# Patient Record
Sex: FEMALE | Race: WHITE | NOT HISPANIC OR LATINO | Employment: OTHER | URBAN - METROPOLITAN AREA
[De-identification: names, ages, dates, MRNs, and addresses within clinical notes are randomized per-mention and may not be internally consistent; named-entity substitution may affect disease eponyms.]

---

## 2017-01-03 RX ORDER — CLONAZEPAM 1 MG/1
1 TABLET ORAL DAILY
Qty: 30 TABLET | Refills: 2 | Status: CANCELLED | OUTPATIENT
Start: 2017-01-03

## 2017-01-06 ENCOUNTER — PATIENT MESSAGE (OUTPATIENT)
Dept: FAMILY MEDICINE | Facility: CLINIC | Age: 82
End: 2017-01-06

## 2017-01-10 ENCOUNTER — PATIENT MESSAGE (OUTPATIENT)
Dept: FAMILY MEDICINE | Facility: CLINIC | Age: 82
End: 2017-01-10

## 2017-01-10 DIAGNOSIS — N39.0 RECURRENT UTI: Primary | ICD-10-CM

## 2017-01-12 ENCOUNTER — PATIENT MESSAGE (OUTPATIENT)
Dept: UROGYNECOLOGY | Facility: CLINIC | Age: 82
End: 2017-01-12

## 2017-01-12 NOTE — TELEPHONE ENCOUNTER
Discussed no urine results from 12/29/2016. Daughter states she is bringing in a specimen tomorrow. Anahy Duckworth, TEGAN-BC

## 2017-01-12 NOTE — TELEPHONE ENCOUNTER
Spoke with the pt, daughter over the phone. Advised that there are no recent results in the pt's chart. New orders have been entered. Urine collection kit has been placed at the  for . Daughter has been advised.

## 2017-01-13 ENCOUNTER — LAB VISIT (OUTPATIENT)
Dept: LAB | Facility: HOSPITAL | Age: 82
End: 2017-01-13
Attending: FAMILY MEDICINE
Payer: MEDICARE

## 2017-01-13 DIAGNOSIS — N39.0 RECURRENT UTI: ICD-10-CM

## 2017-01-13 LAB
BACTERIA #/AREA URNS AUTO: ABNORMAL /HPF
BILIRUB UR QL STRIP: NEGATIVE
CAOX CRY UR QL COMP ASSIST: ABNORMAL
CLARITY UR REFRACT.AUTO: ABNORMAL
COLOR UR AUTO: YELLOW
GLUCOSE UR QL STRIP: NEGATIVE
HGB UR QL STRIP: NEGATIVE
HYALINE CASTS UR QL AUTO: 4 /LPF
KETONES UR QL STRIP: NEGATIVE
LEUKOCYTE ESTERASE UR QL STRIP: ABNORMAL
MICROSCOPIC COMMENT: ABNORMAL
NITRITE UR QL STRIP: NEGATIVE
PH UR STRIP: 6 [PH] (ref 5–8)
PROT UR QL STRIP: ABNORMAL
RBC #/AREA URNS AUTO: 3 /HPF (ref 0–4)
SP GR UR STRIP: 1.02 (ref 1–1.03)
SQUAMOUS #/AREA URNS AUTO: 3 /HPF
URN SPEC COLLECT METH UR: ABNORMAL
UROBILINOGEN UR STRIP-ACNC: NEGATIVE EU/DL
WBC #/AREA URNS AUTO: >100 /HPF (ref 0–5)
WBC CLUMPS UR QL AUTO: ABNORMAL

## 2017-01-13 PROCEDURE — 81001 URINALYSIS AUTO W/SCOPE: CPT

## 2017-01-13 PROCEDURE — 87086 URINE CULTURE/COLONY COUNT: CPT

## 2017-01-14 PROBLEM — I70.0 AORTIC ATHEROSCLEROSIS: Status: ACTIVE | Noted: 2017-01-14

## 2017-01-15 LAB — BACTERIA UR CULT: NORMAL

## 2017-01-17 ENCOUNTER — OFFICE VISIT (OUTPATIENT)
Dept: UROLOGY | Facility: CLINIC | Age: 82
End: 2017-01-17
Payer: MEDICARE

## 2017-01-17 VITALS
BODY MASS INDEX: 29.13 KG/M2 | DIASTOLIC BLOOD PRESSURE: 58 MMHG | HEART RATE: 62 BPM | HEIGHT: 62 IN | WEIGHT: 158.31 LBS | SYSTOLIC BLOOD PRESSURE: 131 MMHG

## 2017-01-17 DIAGNOSIS — N39.0 RECURRENT UTI: Primary | ICD-10-CM

## 2017-01-17 PROCEDURE — 87086 URINE CULTURE/COLONY COUNT: CPT

## 2017-01-17 PROCEDURE — 99999 PR PBB SHADOW E&M-EST. PATIENT-LVL III: CPT | Mod: PBBFAC,,, | Performed by: UROLOGY

## 2017-01-17 PROCEDURE — 99204 OFFICE O/P NEW MOD 45 MIN: CPT | Mod: S$PBB,,, | Performed by: UROLOGY

## 2017-01-17 PROCEDURE — 99213 OFFICE O/P EST LOW 20 MIN: CPT | Mod: PBBFAC | Performed by: UROLOGY

## 2017-01-17 NOTE — PROGRESS NOTES
Subjective:       Patient ID: Reagan Henry is a 89 y.o. female.    Chief Complaint: Advice Only and Urinary Tract Infection (Recurrent)    HPI Comments: Reagan Henry is a 89 y.o. Female here for recurrent UTI. Different organisms.   She also has constipation. She is using fiber gummies.   She is on activia yogurt.     Nocturia x 2-3 times.  Sometimes more 6-8 times. Has swelling in legs at the end of the day. She elevates legs before bed.   She does drink water overnight.   She drinks little water during the day.   Has BM at night sometimes.   No urinary leakage.   No urinary frequency.     She has a cystocele and wears a pessary.   Takes replens.   No history of breast or uterine cancer.    vaginal deliveries.             Urinary Tract Infection    Associated symptoms include frequency. Pertinent negatives include no chills, hematuria, urgency or rash.       Past Surgical History   Procedure Laterality Date    Appendectomy      Cholecystectomy      Eye surgery       cataract       Past Medical History   Diagnosis Date    *Atrial fibrillation      during a pulmonary illness    Arthritis     Broken wrist     CAD (coronary artery disease)     HLD (hyperlipidemia)     Hypertension     Hyponatremia     Incontinence of urine     Osteoporosis, senile        Social History     Social History    Marital status:      Spouse name: N/A    Number of children: N/A    Years of education: N/A     Occupational History    Not on file.     Social History Main Topics    Smoking status: Never Smoker    Smokeless tobacco: Never Used    Alcohol use 0.6 oz/week     1 Glasses of wine per week    Drug use: No    Sexual activity: Not Currently     Other Topics Concern    Not on file     Social History Narrative       Family History   Problem Relation Age of Onset    Cancer Mother      liver    Cancer Father      throat cancer    Heart disease Father     Cancer Sister      lung    Heart disease  Brother      premature    Stroke Brother     Breast cancer Neg Hx     Ovarian cancer Neg Hx     Cervical cancer Neg Hx     Endometrial cancer Neg Hx     Vaginal cancer Neg Hx        Current Outpatient Prescriptions   Medication Sig Dispense Refill    amlodipine (NORVASC) 5 MG tablet TAKE 1 TABLET BY MOUTH ONCE A DAY 30 tablet 5    aspirin (ECOTRIN) 81 MG EC tablet Take 81 mg by mouth as needed.       clonazePAM (KLONOPIN) 1 MG tablet TAKE 1 TABLET BY MOUTH ONCE DAILY. 30 tablet 2    ibuprofen (ADVIL,MOTRIN) 200 MG tablet Take 200 mg by mouth every 6 (six) hours as needed.        lisinopril (PRINIVIL,ZESTRIL) 20 MG tablet Take 1 tablet (20 mg total) by mouth once daily. 30 tablet 5    metoprolol tartrate (LOPRESSOR) 25 MG tablet TAKE 1 TABLET (25 MG TOTAL) BY MOUTH 2 (TWO) TIMES DAILY. 60 tablet 5    omeprazole (PRILOSEC) 20 MG capsule Take 20 mg by mouth once daily.        POLYCARBOPHIL (REPLENS VAGL) Place vaginally. Three times weekly      simvastatin (ZOCOR) 20 MG tablet TAKE 1 TABLET (20 MG TOTAL) BY MOUTH EVERY EVENING. 30 tablet 5    furosemide (LASIX) 20 MG tablet Take 2 tablets (40 mg total) by mouth as needed (for shortness of breath). 60 tablet 2    nitroGLYCERIN (NITROSTAT) 0.4 MG SL tablet Place 1 tablet (0.4 mg total) under the tongue every 5 (five) minutes as needed. 100 tablet 3     No current facility-administered medications for this visit.        Review of patient's allergies indicates:   Allergen Reactions    Codeine Nausea Only    Demerol (pf) [meperidine (pf)]     Phenytoin sodium extended Nausea Only       Review of Systems   Constitutional: Negative for chills, fever and unexpected weight change.   HENT: Negative for nosebleeds.    Eyes: Negative for visual disturbance.   Respiratory: Positive for shortness of breath. Negative for chest tightness.    Cardiovascular: Negative for chest pain.   Gastrointestinal: Negative for diarrhea.   Genitourinary: Positive for frequency  and nocturia. Negative for dysuria, hematuria and urgency.   Musculoskeletal: Negative for myalgias.   Skin: Negative for rash.   Neurological: Negative for seizures.   Hematological: Does not bruise/bleed easily.   Psychiatric/Behavioral: Negative for behavioral problems.       Objective:      Physical Exam   Vitals reviewed.  Constitutional: She is oriented to person, place, and time. She appears well-developed and well-nourished.   HENT:   Head: Normocephalic and atraumatic.   Eyes: No scleral icterus.   Cardiovascular: Normal rate and regular rhythm.    Pulmonary/Chest: Effort normal. No respiratory distress.   Abdominal: She exhibits no mass.   Genitourinary: There is no rash on the right labia. There is no rash on the left labia.   Genitourinary Comments: The external genitalia were normal. No rash. Atrophic vaginitis was present. The urethral showed no evidence of a urethral diverticulum.  And in and out cath was performed under sterile conditions immediately after voiding.  The PVR was 100 ml.    No prolapse.      Musculoskeletal: She exhibits no tenderness.   Lymphadenopathy:     She has no cervical adenopathy.   Neurological: She is alert and oriented to person, place, and time.   Skin: Skin is warm and dry. No rash noted.     Psychiatric: She has a normal mood and affect.       Assessment:       1. Recurrent UTI        Plan:     Discussed UTI precaustion. Avoid constipation. Try miralax. Push fluids.   Discussed estrogen (patient has vaginal bleeding in past with hormone cream.   Will treat as infections come along.   Urine culture for future UTI.    Urine culture today.

## 2017-01-18 ENCOUNTER — PATIENT MESSAGE (OUTPATIENT)
Dept: UROLOGY | Facility: CLINIC | Age: 82
End: 2017-01-18

## 2017-01-18 LAB — BACTERIA UR CULT: NO GROWTH

## 2017-02-11 DIAGNOSIS — I10 ESSENTIAL HYPERTENSION: ICD-10-CM

## 2017-02-11 RX ORDER — METOPROLOL TARTRATE 25 MG/1
TABLET, FILM COATED ORAL
Qty: 60 TABLET | Refills: 4 | Status: SHIPPED | OUTPATIENT
Start: 2017-02-11 | End: 2017-09-15 | Stop reason: SDUPTHER

## 2017-02-13 DIAGNOSIS — I10 ESSENTIAL HYPERTENSION: ICD-10-CM

## 2017-02-13 RX ORDER — METOPROLOL TARTRATE 25 MG/1
25 TABLET, FILM COATED ORAL 2 TIMES DAILY
Qty: 60 TABLET | Refills: 5 | Status: SHIPPED | OUTPATIENT
Start: 2017-02-13 | End: 2017-05-29

## 2017-02-13 NOTE — TELEPHONE ENCOUNTER
LOV 06/28/16  [facility] message has been sent to the patient, to inform them that an appointment is due.

## 2017-02-16 RX ORDER — LISINOPRIL 20 MG/1
20 TABLET ORAL DAILY
Qty: 90 TABLET | Refills: 1 | Status: SHIPPED | OUTPATIENT
Start: 2017-02-16 | End: 2017-09-16 | Stop reason: SDUPTHER

## 2017-02-16 RX ORDER — SIMVASTATIN 20 MG/1
20 TABLET, FILM COATED ORAL NIGHTLY
Qty: 90 TABLET | Refills: 1 | Status: SHIPPED | OUTPATIENT
Start: 2017-02-16 | End: 2017-09-16 | Stop reason: SDUPTHER

## 2017-03-01 ENCOUNTER — PATIENT MESSAGE (OUTPATIENT)
Dept: UROLOGY | Facility: CLINIC | Age: 82
End: 2017-03-01

## 2017-03-02 ENCOUNTER — LAB VISIT (OUTPATIENT)
Dept: LAB | Facility: HOSPITAL | Age: 82
End: 2017-03-02
Attending: UROLOGY
Payer: MEDICARE

## 2017-03-02 DIAGNOSIS — N39.0 RECURRENT UTI: ICD-10-CM

## 2017-03-02 PROCEDURE — 87086 URINE CULTURE/COLONY COUNT: CPT

## 2017-03-05 LAB
BACTERIA UR CULT: NORMAL
BACTERIA UR CULT: NORMAL

## 2017-03-06 ENCOUNTER — PATIENT MESSAGE (OUTPATIENT)
Dept: UROLOGY | Facility: CLINIC | Age: 82
End: 2017-03-06

## 2017-03-09 ENCOUNTER — PATIENT MESSAGE (OUTPATIENT)
Dept: UROLOGY | Facility: CLINIC | Age: 82
End: 2017-03-09

## 2017-03-26 RX ORDER — CLONAZEPAM 1 MG/1
TABLET ORAL
Qty: 30 TABLET | Refills: 2 | Status: SHIPPED | OUTPATIENT
Start: 2017-03-26 | End: 2017-06-29 | Stop reason: SDUPTHER

## 2017-04-06 ENCOUNTER — PATIENT MESSAGE (OUTPATIENT)
Dept: FAMILY MEDICINE | Facility: CLINIC | Age: 82
End: 2017-04-06

## 2017-04-06 NOTE — TELEPHONE ENCOUNTER
Spoke with the patient's daughter (Roxi) & the patient, they advised that the patient the gums are inflamed & the patient thinks she has a cracked tooth on the left lower side. These symptoms have been going on for a week, and the patient has been chewing food on the right side of her mouth. Patient denies any cardiac symptoms. Roxi advised that the patient can't be taken to the doctor due to loose stool, and she does not feel that the ED is needed. Roxi has been alternating ibuprofen & tylenol with out much pain relief for the patient. She stated that the patient just needs antibiotic. Offered appointments for the patient, but the daughter did not want to schedule. Advised that if the pain was severe she should take the patient to the ED. Roxi verbalized understanding, and stated that she would call the dentist on Monday.

## 2017-05-27 ENCOUNTER — PATIENT MESSAGE (OUTPATIENT)
Dept: FAMILY MEDICINE | Facility: CLINIC | Age: 82
End: 2017-05-27

## 2017-05-27 DIAGNOSIS — I10 ESSENTIAL HYPERTENSION: ICD-10-CM

## 2017-05-27 RX ORDER — AMLODIPINE BESYLATE 5 MG/1
TABLET ORAL
Qty: 30 TABLET | Refills: 2 | Status: SHIPPED | OUTPATIENT
Start: 2017-05-27 | End: 2017-08-26 | Stop reason: SDUPTHER

## 2017-05-27 NOTE — LETTER
June 20, 2017    Reagan Henry  [address] [place] [address]             [facility]  [address], [address]  [facility] [address]  Phone: [phone]  Fax: [phone] Dear MsCharly Henry:    Good  morning.  Per Dr. Horn, you are overdue for your annual exam with fasting labs prior.  Please call the clinic [phone] to schedule these two appointments.  You can either schedule with her or the nurse practitioner.  Thank you.      If you have any questions or concerns, please don't hesitate to call.    Sincerely,        Alis Deleon RN

## 2017-05-29 ENCOUNTER — TELEPHONE (OUTPATIENT)
Dept: FAMILY MEDICINE | Facility: CLINIC | Age: 82
End: 2017-05-29

## 2017-05-29 ENCOUNTER — OFFICE VISIT (OUTPATIENT)
Dept: FAMILY MEDICINE | Facility: CLINIC | Age: 82
End: 2017-05-29
Payer: MEDICARE

## 2017-05-29 VITALS — HEART RATE: 60 BPM | TEMPERATURE: 98 F | SYSTOLIC BLOOD PRESSURE: 118 MMHG | DIASTOLIC BLOOD PRESSURE: 52 MMHG

## 2017-05-29 DIAGNOSIS — I35.0 NONRHEUMATIC AORTIC VALVE STENOSIS: Chronic | ICD-10-CM

## 2017-05-29 DIAGNOSIS — R41.3 MEMORY LOSS: Primary | ICD-10-CM

## 2017-05-29 DIAGNOSIS — K04.7 DENTAL INFECTION: Primary | ICD-10-CM

## 2017-05-29 PROCEDURE — 99999 PR PBB SHADOW E&M-EST. PATIENT-LVL III: CPT | Mod: PBBFAC,,, | Performed by: FAMILY MEDICINE

## 2017-05-29 PROCEDURE — 99213 OFFICE O/P EST LOW 20 MIN: CPT | Mod: PBBFAC,PO | Performed by: FAMILY MEDICINE

## 2017-05-29 PROCEDURE — 99213 OFFICE O/P EST LOW 20 MIN: CPT | Mod: S$PBB,,, | Performed by: FAMILY MEDICINE

## 2017-05-29 RX ORDER — PENICILLIN V POTASSIUM 250 MG/1
250 TABLET, FILM COATED ORAL 4 TIMES DAILY
Qty: 40 TABLET | Refills: 0 | Status: SHIPPED | OUTPATIENT
Start: 2017-05-29 | End: 2017-09-20

## 2017-05-29 RX ORDER — IBUPROFEN 400 MG/1
400 TABLET ORAL EVERY 6 HOURS PRN
Qty: 40 TABLET | Refills: 0 | Status: SHIPPED | OUTPATIENT
Start: 2017-05-29 | End: 2017-09-20

## 2017-05-29 NOTE — TELEPHONE ENCOUNTER
Per provider's instructions, patient's daughter notified that visit may not be covered by insurance if she has a true dental problem. Daughter verbalizes understanding. Voices that she had a dental issue before, was seen at urgent care and visit was covered. Voices that she would like to keep appointment regardless of coverage.

## 2017-05-29 NOTE — TELEPHONE ENCOUNTER
----- Message from Anusha Hercules sent at 5/29/2017 12:30 PM CDT -----  Contact: Roxi daughter- [phone]  Daughter needs information about pre hospice program and would like to be called back with information.

## 2017-06-02 NOTE — TELEPHONE ENCOUNTER
I can put in a consult for someone to come out discuss this option with them  and then if they would like to proceed I can put in an order for that.

## 2017-06-29 ENCOUNTER — PATIENT MESSAGE (OUTPATIENT)
Dept: FAMILY MEDICINE | Facility: CLINIC | Age: 82
End: 2017-06-29

## 2017-06-29 RX ORDER — CLONAZEPAM 1 MG/1
TABLET ORAL
Qty: 30 TABLET | Refills: 2 | Status: CANCELLED | OUTPATIENT
Start: 2017-06-29

## 2017-06-29 RX ORDER — CLONAZEPAM 1 MG/1
1 TABLET ORAL DAILY
Qty: 30 TABLET | Refills: 2 | Status: SHIPPED | OUTPATIENT
Start: 2017-06-29 | End: 2017-09-23 | Stop reason: SDUPTHER

## 2017-06-29 NOTE — TELEPHONE ENCOUNTER
She is due for a physical. We can not refill this again after this without an appointment-please let her daughter know.(pt has some mild dementia) thanks

## 2017-07-07 ENCOUNTER — PATIENT MESSAGE (OUTPATIENT)
Dept: FAMILY MEDICINE | Facility: CLINIC | Age: 82
End: 2017-07-07

## 2017-07-07 DIAGNOSIS — I10 ESSENTIAL HYPERTENSION: ICD-10-CM

## 2017-07-07 DIAGNOSIS — E78.5 HYPERLIPIDEMIA, UNSPECIFIED HYPERLIPIDEMIA TYPE: Primary | ICD-10-CM

## 2017-07-28 ENCOUNTER — PATIENT MESSAGE (OUTPATIENT)
Dept: FAMILY MEDICINE | Facility: CLINIC | Age: 82
End: 2017-07-28

## 2017-07-28 ENCOUNTER — PATIENT MESSAGE (OUTPATIENT)
Dept: UROLOGY | Facility: CLINIC | Age: 82
End: 2017-07-28

## 2017-07-28 DIAGNOSIS — N39.0 URINARY TRACT INFECTION WITHOUT HEMATURIA, SITE UNSPECIFIED: Primary | ICD-10-CM

## 2017-07-29 ENCOUNTER — LAB VISIT (OUTPATIENT)
Dept: LAB | Facility: HOSPITAL | Age: 82
End: 2017-07-29
Attending: UROLOGY
Payer: MEDICARE

## 2017-07-29 DIAGNOSIS — N39.0 URINARY TRACT INFECTION WITHOUT HEMATURIA, SITE UNSPECIFIED: ICD-10-CM

## 2017-07-29 PROCEDURE — 87086 URINE CULTURE/COLONY COUNT: CPT

## 2017-07-30 LAB — BACTERIA UR CULT: NORMAL

## 2017-07-31 ENCOUNTER — PATIENT MESSAGE (OUTPATIENT)
Dept: UROLOGY | Facility: CLINIC | Age: 82
End: 2017-07-31

## 2017-08-21 RX ORDER — NITROGLYCERIN 0.4 MG/1
0.4 TABLET SUBLINGUAL EVERY 5 MIN PRN
Qty: 100 TABLET | Refills: 3 | Status: SHIPPED | OUTPATIENT
Start: 2017-08-21

## 2017-08-26 DIAGNOSIS — I10 ESSENTIAL HYPERTENSION: ICD-10-CM

## 2017-08-26 RX ORDER — AMLODIPINE BESYLATE 5 MG/1
TABLET ORAL
Qty: 30 TABLET | Refills: 2 | Status: SHIPPED | OUTPATIENT
Start: 2017-08-26 | End: 2017-11-18 | Stop reason: SDUPTHER

## 2017-09-15 DIAGNOSIS — I10 ESSENTIAL HYPERTENSION: ICD-10-CM

## 2017-09-15 RX ORDER — METOPROLOL TARTRATE 25 MG/1
TABLET, FILM COATED ORAL
Qty: 60 TABLET | Refills: 2 | Status: SHIPPED | OUTPATIENT
Start: 2017-09-15 | End: 2018-02-16 | Stop reason: SDUPTHER

## 2017-09-17 RX ORDER — SIMVASTATIN 20 MG/1
20 TABLET, FILM COATED ORAL NIGHTLY
Qty: 90 TABLET | Refills: 1 | Status: SHIPPED | OUTPATIENT
Start: 2017-09-17

## 2017-09-17 RX ORDER — LISINOPRIL 20 MG/1
20 TABLET ORAL DAILY
Qty: 90 TABLET | Refills: 1 | Status: SHIPPED | OUTPATIENT
Start: 2017-09-17

## 2017-09-20 ENCOUNTER — OFFICE VISIT (OUTPATIENT)
Dept: UROGYNECOLOGY | Facility: CLINIC | Age: 82
End: 2017-09-20
Payer: MEDICARE

## 2017-09-20 VITALS
WEIGHT: 146.81 LBS | HEIGHT: 62 IN | BODY MASS INDEX: 27.02 KG/M2 | SYSTOLIC BLOOD PRESSURE: 130 MMHG | DIASTOLIC BLOOD PRESSURE: 60 MMHG

## 2017-09-20 DIAGNOSIS — K59.00 CONSTIPATION, UNSPECIFIED CONSTIPATION TYPE: ICD-10-CM

## 2017-09-20 DIAGNOSIS — N95.2 VAGINAL ATROPHY: ICD-10-CM

## 2017-09-20 DIAGNOSIS — B37.2 YEAST DERMATITIS: ICD-10-CM

## 2017-09-20 DIAGNOSIS — N39.46 MIXED STRESS AND URGE URINARY INCONTINENCE: Primary | ICD-10-CM

## 2017-09-20 PROCEDURE — 1159F MED LIST DOCD IN RCRD: CPT | Mod: ,,, | Performed by: NURSE PRACTITIONER

## 2017-09-20 PROCEDURE — 99213 OFFICE O/P EST LOW 20 MIN: CPT | Mod: PBBFAC | Performed by: NURSE PRACTITIONER

## 2017-09-20 PROCEDURE — 1126F AMNT PAIN NOTED NONE PRSNT: CPT | Mod: ,,, | Performed by: NURSE PRACTITIONER

## 2017-09-20 PROCEDURE — 99213 OFFICE O/P EST LOW 20 MIN: CPT | Mod: S$PBB,,, | Performed by: NURSE PRACTITIONER

## 2017-09-20 PROCEDURE — 99999 PR PBB SHADOW E&M-EST. PATIENT-LVL III: CPT | Mod: PBBFAC,,, | Performed by: NURSE PRACTITIONER

## 2017-09-20 RX ORDER — NYSTATIN AND TRIAMCINOLONE ACETONIDE 100000; 1 [USP'U]/G; MG/G
OINTMENT TOPICAL 2 TIMES DAILY
Qty: 30 G | Refills: 2 | Status: SHIPPED | OUTPATIENT
Start: 2017-09-20 | End: 2018-01-02 | Stop reason: SDUPTHER

## 2017-09-20 RX ORDER — NYSTATIN 100000 [USP'U]/G
POWDER TOPICAL 2 TIMES DAILY
Qty: 30 G | Refills: 2 | Status: SHIPPED | OUTPATIENT
Start: 2017-09-20 | End: 2018-01-02 | Stop reason: SDUPTHER

## 2017-09-20 NOTE — PROGRESS NOTES
PESSARY CHECK  2017    [facility]  [address] [address]    Reagan KESSLER Carl  [id number]  [id number]      Zafarmatthew KESSLER Carl is a 89 y.o.   here for a pessary check/cleaning.      HPI dated 2016  1)  UI:  (--) TRINA   (+) UUI  (+) liner pads:2/day, usually minimum wetness. Daytime frequency: Q 4 hours.  Nocturia: Yes: 1/night.   (--) dysuria,  (--) hematuria,  (--) frequent UTIs.  (+) complete bladder emptying.   2)  POP:  Absent with pessary in place. Symptoms:(--)   (--) vaginal bleeding (--) vaginal discharge. (--) sexually active.   (+)  Vaginal dryness.  (--) vaginal estrogen use.   3)  BM:  (+) constipation/straining.  (--) chronic diarrhea. (--) hematochezia.  (--) fecal incontinence.  (--) fecal smearing/urgency.  (--) incomplete evacuation.  Uses miralax occasionally.  4)Pessary: No pain, Yes bleeding, No discharge  5)Post menopausal bleeding-- does not want to work up at this time due to chf.      History since last visit.  1)  UI:  (--) TRINA   (+) UUI  (+) liner pads:2/day, usually minimum wetness. Daytime frequency: Q 4 hours.  Nocturia: Yes: 1/night.   (--) dysuria,  (--) hematuria,  (--) frequent UTIs.  (+) complete bladder emptying.     2)  POP:  Absent with pessary in place. Symptoms:(--)   (--) vaginal bleeding (--) vaginal discharge. (--) sexually active.   (+)  Vaginal dryness.  (--) vaginal estrogen use.     3)  BM:  (+) constipation/straining.  (--) chronic diarrhea. (--) hematochezia.  (--) fecal incontinence.  (--) fecal smearing/urgency.  (--) incomplete evacuation.  Uses miralax occasionally.    4)Pessary: No pain, Yes bleeding, No discharge    5)Post menopausal bleeding-- does not want to work up at this time due to chf.    2014  embx-TISSUE INSUFFICIENT FOR DIAGNOSIS RARE INACTIVE GLANDULAR FRAGMENTS    2015  Only transabdominal scans of the pelvis were performed because is there is a pessary present and there was no one available in  the OB/GYN department to remove it. Transabdominally the uterus measures 5.21 0.9 x 2.9 cm with some fluid in the endometrium measuring about 3 to 4 mm in diameter. Ovaries are not seen.    Has not had repeat pelvic u/s    Past Medical History:   Diagnosis Date    *Atrial fibrillation 8/09    during a pulmonary illness    Arthritis     Broken wrist     CAD (coronary artery disease)     HLD (hyperlipidemia)     Hypertension     Hyponatremia     Incontinence of urine     Osteoporosis, senile        Past Surgical History:   Procedure Laterality Date    APPENDECTOMY      CHOLECYSTECTOMY      EYE SURGERY      cataract       Current Outpatient Prescriptions   Medication Sig    amlodipine (NORVASC) 5 MG tablet TAKE 1 TABLET BY MOUTH ONCE A DAY    aspirin (ECOTRIN) 81 MG EC tablet Take 81 mg by mouth as needed.     ibuprofen (ADVIL,MOTRIN) 200 MG tablet Take 200 mg by mouth every 6 (six) hours as needed.      lisinopril (PRINIVIL,ZESTRIL) 20 MG tablet TAKE 1 TABLET (20 MG TOTAL) BY MOUTH ONCE DAILY.    metoprolol tartrate (LOPRESSOR) 25 MG tablet TAKE 1 TABLET (25 MG TOTAL) BY MOUTH 2 (TWO) TIMES DAILY.    NITROSTAT 0.4 mg SL tablet PLACE 1 TABLET (0.4 MG TOTAL) UNDER THE TONGUE EVERY 5 (FIVE) MINUTES AS NEEDED.    omeprazole (PRILOSEC) 20 MG capsule Take 20 mg by mouth once daily.      POLYCARBOPHIL (REPLENS VAGL) Place vaginally. Three times weekly    simvastatin (ZOCOR) 20 MG tablet TAKE 1 TABLET (20 MG TOTAL) BY MOUTH EVERY EVENING.    clonazePAM (KLONOPIN) 1 MG tablet TAKE 1 TABLET (1 MG TOTAL) BY MOUTH ONCE DAILY.    nystatin (NYSTOP) powder Apply topically 2 (two) times daily.    nystatin-triamcinolone (MYCOLOG) ointment Apply topically 2 (two) times daily.     No current facility-administered medications for this visit.          Well Woman:  Pap:2/2013- normal  Mammo:not up to day  Colonoscopy: patient does not want at this time.  Dexa:patient does not want at this time  ROS:  As per HPI.   "    Exam  /60 (BP Location: Right arm, Patient Position: Sitting, BP Method: Large (Manual))   Ht 5' 2" (1.575 m)   Wt 66.6 kg (146 lb 13.2 oz)   BMI 26.85 kg/m²   General: alert and oriented, no acute distress  Abd: soft, non-tender, non-distended    Pelvic  Ext. Genitalia: Red, raised rash noted in bilateral groin area consistent with yeast dermatitis.  Vagina: + atrophy. Normal vaginal mucosa without lesions. No granulation tissue or discharge noted.  No bleeding noted.  Non-tender bladder base without palpable mass. 2  LS GH pessary in place.  Cervix: no lesions-   Uterus:  uterus is  8 week size, shape, consistency and nontender   Urethra: no masses or tenderness  Urethral meatus: no lesions, caruncle or prolapse.    The pessary device was removed with difficulty.   Washed with soap and water.  The pessary device was replaced without issue.     The patient tolerated the procedure well.      POP-Q:  Deferred due to pessary use      1. Mixed stress and urge urinary incontinence     2. Yeast dermatitis  nystatin-triamcinolone (MYCOLOG) ointment    nystatin (NYSTOP) powder   3. Vaginal atrophy     4. Constipation, unspecified constipation type           We reviewed the above issues and discussed options for short-term versus long-term management of her problems.   Plan:   1) Mixed urinary incontinence, urge > stress:   --Empty bladder every 3 hours. Empty well: wait a minute, lean forward on toilet.   --Avoid dietary irritants (see sheet). Keep diary x 3-5 days to determine your irritants.  --KEGELS: do 10 in AM and 10 in PM, holding each x 10 seconds. When you feel urge to go, STOP, KEGEL, and when urge has passed, then go to bathroom. Consider PT in future.   --URGE: consider anticholinergic.Takes 2-4 weeks to see if will have effect. For dry mouth: get sour, sugar free lozenge or gum.   --STRESS: Pessary vs. Sling.       2)Vaginal atrophy (dryness)/irritation:   Use replens 1/2 applicator 2-3 times a " week.     3)Controlling constipation may help bladder urgency/leakage and fiber may better control cholesterol and blood glucose. Start daily fiber. Try cinnamon metamucil wafers or fiber gummies. May also use over the counter stool softener 1-2 x/day. AVOID laxatives    4)yeast dermatitis  --mycolog II cream to groin twice daily x 2 weeks--wash and dry well in between applications  --after 2 weeks apply nystatin powder in the groin twice daily--wash and dry well in between applications    5)RTC 1 month for follow up    30 minutes were spent in face to face time with this patient  75 % of this time was spent in counseling and/or coordination of care    TEGAN Wolf-BC [facility]  Division of Female Pelvic Medicine and Reconstructive Surgery  Department of Obstetrics & Gynecology

## 2017-09-20 NOTE — PATIENT INSTRUCTIONS
1) Mixed urinary incontinence, urge > stress:   --Empty bladder every 3 hours. Empty well: wait a minute, lean forward on toilet.   --Avoid dietary irritants (see sheet). Keep diary x 3-5 days to determine your irritants.  --KEGELS: do 10 in AM and 10 in PM, holding each x 10 seconds. When you feel urge to go, STOP, KEGEL, and when urge has passed, then go to bathroom. Consider PT in future.   --URGE: consider anticholinergic.Takes 2-4 weeks to see if will have effect. For dry mouth: get sour, sugar free lozenge or gum.   --STRESS: Pessary vs. Sling.       2)Vaginal atrophy (dryness)/irritation:   Use replens 1/2 applicator 2-3 times a week.     3)Controlling constipation may help bladder urgency/leakage and fiber may better control cholesterol and blood glucose. Start daily fiber. Try cinnamon metamucil wafers or fiber gummies. May also use over the counter stool softener 1-2 x/day. AVOID laxatives    4)yeast dermatitis  --mycolog II cream to groin twice daily x 2 weeks--wash and dry well in between applications  --after 2 weeks apply nystatin powder in the groin twice daily--wash and dry well in between applications    5)RTC 1 month for follow up

## 2017-09-24 RX ORDER — CLONAZEPAM 1 MG/1
1 TABLET ORAL DAILY
Qty: 30 TABLET | Refills: 2 | Status: SHIPPED | OUTPATIENT
Start: 2017-09-24 | End: 2017-12-16 | Stop reason: SDUPTHER

## 2017-11-02 ENCOUNTER — PATIENT MESSAGE (OUTPATIENT)
Dept: FAMILY MEDICINE | Facility: CLINIC | Age: 82
End: 2017-11-02

## 2017-11-03 ENCOUNTER — TELEPHONE (OUTPATIENT)
Dept: FAMILY MEDICINE | Facility: CLINIC | Age: 82
End: 2017-11-03

## 2017-11-03 NOTE — TELEPHONE ENCOUNTER
----- Message from Hafsa David sent at 11/3/2017 10:09 AM CDT -----  Contact: Roxi Hopkins (Daughter) [phone]  Called to reschedule her physical today for 300 pm, states that her mom is sick with stomach virus and could not come today. I rescheduled her for first available 11/24/17 at 1120. But they would like an afternoon if possible

## 2017-11-18 ENCOUNTER — LAB VISIT (OUTPATIENT)
Dept: LAB | Facility: HOSPITAL | Age: 82
End: 2017-11-18
Attending: FAMILY MEDICINE
Payer: MEDICARE

## 2017-11-18 DIAGNOSIS — R53.81 MALAISE AND FATIGUE: Chronic | ICD-10-CM

## 2017-11-18 DIAGNOSIS — I10 ESSENTIAL HYPERTENSION: ICD-10-CM

## 2017-11-18 DIAGNOSIS — R53.83 MALAISE AND FATIGUE: Chronic | ICD-10-CM

## 2017-11-18 DIAGNOSIS — I35.0 NONRHEUMATIC AORTIC VALVE STENOSIS: Chronic | ICD-10-CM

## 2017-11-18 DIAGNOSIS — D64.9 ANEMIA: ICD-10-CM

## 2017-11-18 DIAGNOSIS — D50.9 IRON DEFICIENCY ANEMIA: ICD-10-CM

## 2017-11-18 DIAGNOSIS — E78.5 HYPERLIPIDEMIA, UNSPECIFIED HYPERLIPIDEMIA TYPE: ICD-10-CM

## 2017-11-18 LAB
ALBUMIN SERPL BCP-MCNC: 3.2 G/DL
ALP SERPL-CCNC: 84 U/L
ALT SERPL W/O P-5'-P-CCNC: 14 U/L
ANION GAP SERPL CALC-SCNC: 11 MMOL/L
AST SERPL-CCNC: 20 U/L
BASOPHILS # BLD AUTO: 0.07 K/UL
BASOPHILS NFR BLD: 1 %
BILIRUB SERPL-MCNC: 0.4 MG/DL
BNP SERPL-MCNC: 155 PG/ML
BUN SERPL-MCNC: 21 MG/DL
CALCIUM SERPL-MCNC: 9.7 MG/DL
CHLORIDE SERPL-SCNC: 106 MMOL/L
CHOLEST SERPL-MCNC: 147 MG/DL
CHOLEST/HDLC SERPL: 3.3 {RATIO}
CO2 SERPL-SCNC: 23 MMOL/L
CREAT SERPL-MCNC: 0.7 MG/DL
DIFFERENTIAL METHOD: ABNORMAL
EOSINOPHIL # BLD AUTO: 0.3 K/UL
EOSINOPHIL NFR BLD: 3.6 %
ERYTHROCYTE [DISTWIDTH] IN BLOOD BY AUTOMATED COUNT: 14.1 %
EST. GFR  (AFRICAN AMERICAN): >60 ML/MIN/1.73 M^2
EST. GFR  (NON AFRICAN AMERICAN): >60 ML/MIN/1.73 M^2
FERRITIN SERPL-MCNC: 23 NG/ML
GLUCOSE SERPL-MCNC: 97 MG/DL
HCT VFR BLD AUTO: 40 %
HDLC SERPL-MCNC: 45 MG/DL
HDLC SERPL: 30.6 %
HGB BLD-MCNC: 13.1 G/DL
IMM GRANULOCYTES # BLD AUTO: 0.01 K/UL
IMM GRANULOCYTES NFR BLD AUTO: 0.1 %
IRON SERPL-MCNC: 56 UG/DL
LDLC SERPL CALC-MCNC: 69.4 MG/DL
LYMPHOCYTES # BLD AUTO: 2.3 K/UL
LYMPHOCYTES NFR BLD: 32.1 %
MCH RBC QN AUTO: 31.4 PG
MCHC RBC AUTO-ENTMCNC: 32.8 G/DL
MCV RBC AUTO: 96 FL
MONOCYTES # BLD AUTO: 0.7 K/UL
MONOCYTES NFR BLD: 9.8 %
NEUTROPHILS # BLD AUTO: 3.9 K/UL
NEUTROPHILS NFR BLD: 53.4 %
NONHDLC SERPL-MCNC: 102 MG/DL
NRBC BLD-RTO: 0 /100 WBC
PLATELET # BLD AUTO: 323 K/UL
PMV BLD AUTO: 10.3 FL
POTASSIUM SERPL-SCNC: 4.3 MMOL/L
PROT SERPL-MCNC: 7.1 G/DL
RBC # BLD AUTO: 4.17 M/UL
SATURATED IRON: 15 %
SODIUM SERPL-SCNC: 140 MMOL/L
TOTAL IRON BINDING CAPACITY: 371 UG/DL
TRANSFERRIN SERPL-MCNC: 251 MG/DL
TRIGL SERPL-MCNC: 163 MG/DL
TSH SERPL DL<=0.005 MIU/L-ACNC: 3.28 UIU/ML
WBC # BLD AUTO: 7.25 K/UL

## 2017-11-18 PROCEDURE — 85025 COMPLETE CBC W/AUTO DIFF WBC: CPT

## 2017-11-18 PROCEDURE — 80053 COMPREHEN METABOLIC PANEL: CPT

## 2017-11-18 PROCEDURE — 36415 COLL VENOUS BLD VENIPUNCTURE: CPT | Mod: PO

## 2017-11-18 PROCEDURE — 84443 ASSAY THYROID STIM HORMONE: CPT

## 2017-11-18 PROCEDURE — 83540 ASSAY OF IRON: CPT

## 2017-11-18 PROCEDURE — 82728 ASSAY OF FERRITIN: CPT

## 2017-11-18 PROCEDURE — 83880 ASSAY OF NATRIURETIC PEPTIDE: CPT

## 2017-11-18 PROCEDURE — 80061 LIPID PANEL: CPT

## 2017-11-20 RX ORDER — AMLODIPINE BESYLATE 5 MG/1
TABLET ORAL
Qty: 30 TABLET | Refills: 2 | Status: SHIPPED | OUTPATIENT
Start: 2017-11-20 | End: 2018-02-25 | Stop reason: SDUPTHER

## 2017-11-27 ENCOUNTER — PATIENT MESSAGE (OUTPATIENT)
Dept: FAMILY MEDICINE | Facility: CLINIC | Age: 82
End: 2017-11-27

## 2017-11-28 NOTE — TELEPHONE ENCOUNTER
Spoke with the patient's daughter (Roxi), who advised that the patient has been constipated as of lately & the blood is bright red with the bowel movements. Advised Roxi that if the patient is having a significant amount of bleeding with bowel movents & she is weak then she needs to be taken to the ER. Roxi advised that the patient is not up to going anywhere right now for testing, and that she is chrnically weak due to her CHF. Roxi thinking that the issue may be related to Hemorrhoids or an Anal Fissure, caused by the constipation. Roxi advised that she is going to try giving the patient Miralax for the constipation, and if there is another episode of heavy bleeding she will take the pt to the ER. Roxi was asked if the patient is having black or tarry stool, but was unable to say. She will check with the patient. Advised that if the patient is having black or tarry stool then a trip to the ER is needed immediately, since this could mean she is having a GI bleed. Roxi verbalized understanding & stated that she will check with the patient.     Please advise if anything further needs to be done.

## 2017-12-16 RX ORDER — CLONAZEPAM 1 MG/1
1 TABLET ORAL DAILY
Qty: 30 TABLET | Refills: 2 | Status: SHIPPED | OUTPATIENT
Start: 2017-12-16

## 2017-12-26 RX ORDER — CLONAZEPAM 1 MG/1
1 TABLET ORAL DAILY
Qty: 30 TABLET | Refills: 2 | Status: SHIPPED | OUTPATIENT
Start: 2017-12-26

## 2017-12-27 RX ORDER — CLONAZEPAM 1 MG/1
1 TABLET ORAL DAILY
Qty: 30 TABLET | Refills: 2 | Status: CANCELLED | OUTPATIENT
Start: 2017-12-27

## 2018-01-01 ENCOUNTER — PATIENT MESSAGE (OUTPATIENT)
Dept: UROGYNECOLOGY | Facility: CLINIC | Age: 83
End: 2018-01-01

## 2018-01-02 ENCOUNTER — TELEPHONE (OUTPATIENT)
Dept: UROGYNECOLOGY | Facility: CLINIC | Age: 83
End: 2018-01-02

## 2018-01-02 DIAGNOSIS — B37.2 YEAST DERMATITIS: ICD-10-CM

## 2018-01-02 RX ORDER — NYSTATIN 100000 [USP'U]/G
POWDER TOPICAL 2 TIMES DAILY
Qty: 30 G | Refills: 2 | Status: SHIPPED | OUTPATIENT
Start: 2018-01-02

## 2018-01-02 RX ORDER — NYSTATIN AND TRIAMCINOLONE ACETONIDE 100000; 1 [USP'U]/G; MG/G
OINTMENT TOPICAL 2 TIMES DAILY
Qty: 30 G | Refills: 2 | Status: SHIPPED | OUTPATIENT
Start: 2018-01-02

## 2018-01-02 NOTE — TELEPHONE ENCOUNTER
Pt daughter Roxi called requesting a refill of nystatin powder & nystatin cream. Pt last seen on 09/20/2017.  I informed pt that WILL Duckworth will send in another prescription for her & it's time for her to bring her mom in for a pessary check, Roxi stated her mom doesn't have a pessary in. I informed her I'm going to speak with WILL Duckworth about it and give her a call back. Roxi verbalized understanding call ended.

## 2018-01-02 NOTE — TELEPHONE ENCOUNTER
Pt daughter Roxi called requesting a refill of nystatin powder & nystatin cream. Pt last seen on 09/20/2017.  I informed pt that WILL Duckworth will send in another prescription for her & it's time for her to bring her mom in for a pessary check, Roxi stated her mom doesn't have a pessary in. I informed her I'm going to speak with WILL Duckworth about it and give her a call back. Roxi verbalized understanding call ended.           Spoke with Roxi and informed her that her mom do have a pessary in her and she need to make a appointment to get it checked. Roxi insisted her mom does not have a pessary in her, she stated she was told that the pessary was no longer in her mom. I informed her on her mom last visit note it states she has a pessay and she need to make an appointment to get it check. Roxi stated ok and thank you and hung the phone up.

## 2018-01-02 NOTE — TELEPHONE ENCOUNTER
Spoke with Roxi and informed her that her mom do have a pessary in her and she need to make a appointment to get it checked. Roxi insisted her mom does not have a pessary in her, she stated she was told that the pessary was no longer in her mom. I informed her on her mom last visit note it states she has a pessay and she need to make an appointment to get it check. Roxi stated ok and thank you and hung the phone up.

## 2018-01-25 ENCOUNTER — PATIENT MESSAGE (OUTPATIENT)
Dept: FAMILY MEDICINE | Facility: CLINIC | Age: 83
End: 2018-01-25

## 2018-02-10 ENCOUNTER — PATIENT MESSAGE (OUTPATIENT)
Dept: FAMILY MEDICINE | Facility: CLINIC | Age: 83
End: 2018-02-10

## 2018-02-10 DIAGNOSIS — I50.22 CHRONIC SYSTOLIC HEART FAILURE: ICD-10-CM

## 2018-02-16 DIAGNOSIS — I10 ESSENTIAL HYPERTENSION: ICD-10-CM

## 2018-02-16 RX ORDER — METOPROLOL TARTRATE 25 MG/1
TABLET, FILM COATED ORAL
Qty: 60 TABLET | Refills: 2 | Status: SHIPPED | OUTPATIENT
Start: 2018-02-16

## 2018-02-16 NOTE — TELEPHONE ENCOUNTER
Advised hCyna that the message was received, and she stated there is nothing else that needs to be done by our office.

## 2018-02-16 NOTE — TELEPHONE ENCOUNTER
----- Message from Iker Guerra sent at 2/16/2018 11:51 AM CST -----  Contact: Chyna/Verónica Hostpis/[phone]  Pt wants the company to come out on Monday. They will try and admit her at 1pm. Please adviser.    Thank You

## 2018-02-25 DIAGNOSIS — I10 ESSENTIAL HYPERTENSION: ICD-10-CM

## 2018-02-25 RX ORDER — AMLODIPINE BESYLATE 5 MG/1
TABLET ORAL
Qty: 30 TABLET | Refills: 2 | Status: SHIPPED | OUTPATIENT
Start: 2018-02-25

## 2018-03-08 ENCOUNTER — PATIENT MESSAGE (OUTPATIENT)
Dept: FAMILY MEDICINE | Facility: CLINIC | Age: 83
End: 2018-03-08

## 2018-03-14 RX ORDER — SIMVASTATIN 20 MG/1
20 TABLET, FILM COATED ORAL NIGHTLY
Qty: 90 TABLET | Refills: 1 | OUTPATIENT
Start: 2018-03-14

## 2018-03-14 RX ORDER — LISINOPRIL 20 MG/1
20 TABLET ORAL DAILY
Qty: 90 TABLET | Refills: 1 | OUTPATIENT
Start: 2018-03-14

## 2021-06-24 NOTE — PROGRESS NOTES
Please advise, you saw pt on 3/6   Subjective:       Patient ID: Reagan Henry is a 89 y.o. female.    Chief Complaint: Dental Pain (infected tooth)   patient was brought in today by her daughter because of swelling of the right lower jaw.  Patient is been having pain with opening and closing of the mouth, chewing and drinking fluids.  Patient is had some low-grade fever was unable to reach the dentist today regarding the infected  Dental issue.  Patient has been taking ibuprofen for pain which seems to help  HPI see above  Review of Systems   Constitutional: Positive for fever.   HENT: Positive for dental problem and facial swelling.    Eyes: Negative.    Respiratory: Negative.    Cardiovascular: Negative.    Gastrointestinal: Negative.    Endocrine: Negative.    Genitourinary: Negative.    Musculoskeletal: Negative.    Allergic/Immunologic: Negative.    Neurological: Negative.    Hematological: Negative.    Psychiatric/Behavioral: Negative.        Objective:      Physical Exam   Constitutional: She is oriented to person, place, and time. She appears well-developed and well-nourished. She appears distressed.   HENT:   Head: Normocephalic and atraumatic.   Right Ear: External ear normal.   Left Ear: External ear normal.   Nose: Nose normal.   Mouth/Throat: Oropharynx is clear and moist.       Patient has facial swelling in the region of the right lower jaw.  Tenderness to percussion of the first molar on the lower right side.  Patient has marked dental caries throughout.   Eyes: Conjunctivae and EOM are normal. Pupils are equal, round, and reactive to light.   Neck: Neck supple. No JVD present.   Cardiovascular: Normal rate, regular rhythm and normal heart sounds.    Pulmonary/Chest: Effort normal and breath sounds normal. No respiratory distress. She has no wheezes. She has no rales. She exhibits no tenderness.   Abdominal: Soft. Bowel sounds are normal. She exhibits no distension and no mass. There is no tenderness. There is no rebound and no  guarding.   Neurological: She is alert and oriented to person, place, and time. She has normal reflexes. She displays normal reflexes. No cranial nerve deficit. She exhibits normal muscle tone. Coordination normal.   Skin: Skin is warm and dry. No rash noted. No erythema. No pallor.   Psychiatric: She has a normal mood and affect. Her behavior is normal. Judgment and thought content normal.   Nursing note and vitals reviewed.      Assessment:       1. Dental infection     2.     Facial swelling  Plan:     see med card dated 5-29-17   ibuprofen  penicillin v potassium      pt needs to call her dentist in the AM  And arrange for f/u dental care for the infection  And health of the affected tooth,